# Patient Record
Sex: FEMALE | Race: BLACK OR AFRICAN AMERICAN | Employment: FULL TIME | URBAN - METROPOLITAN AREA
[De-identification: names, ages, dates, MRNs, and addresses within clinical notes are randomized per-mention and may not be internally consistent; named-entity substitution may affect disease eponyms.]

---

## 2018-06-04 ENCOUNTER — OFFICE VISIT (OUTPATIENT)
Dept: OBGYN CLINIC | Age: 53
End: 2018-06-04

## 2018-06-04 ENCOUNTER — HOSPITAL ENCOUNTER (OUTPATIENT)
Dept: LAB | Age: 53
Discharge: HOME | End: 2018-06-04
Payer: OTHER GOVERNMENT

## 2018-06-04 VITALS
BODY MASS INDEX: 23.48 KG/M2 | SYSTOLIC BLOOD PRESSURE: 144 MMHG | HEIGHT: 70 IN | OXYGEN SATURATION: 100 % | DIASTOLIC BLOOD PRESSURE: 84 MMHG | RESPIRATION RATE: 18 BRPM | WEIGHT: 164 LBS | HEART RATE: 90 BPM

## 2018-06-04 DIAGNOSIS — N90.89 VULVAR LESION: ICD-10-CM

## 2018-06-04 DIAGNOSIS — Z01.411 ENCOUNTER FOR GYNECOLOGICAL EXAMINATION WITH ABNORMAL FINDING: Primary | ICD-10-CM

## 2018-06-04 PROCEDURE — 88175 CYTOPATH C/V AUTO FLUID REDO: CPT | Performed by: OBSTETRICS & GYNECOLOGY

## 2018-06-04 PROCEDURE — 87624 HPV HI-RISK TYP POOLED RSLT: CPT | Performed by: OBSTETRICS & GYNECOLOGY

## 2018-06-04 RX ORDER — IMIQUIMOD 12.5 MG/.25G
CREAM TOPICAL
Qty: 24 PACKET | Refills: 1 | Status: SHIPPED | OUTPATIENT
Start: 2018-06-04 | End: 2022-03-11

## 2023-01-31 RX ORDER — ALBUTEROL SULFATE 90 UG/1
AEROSOL, METERED RESPIRATORY (INHALATION)
COMMUNITY

## 2023-01-31 RX ORDER — FLUTICASONE PROPIONATE 110 UG/1
AEROSOL, METERED RESPIRATORY (INHALATION)
COMMUNITY

## 2024-01-11 ENCOUNTER — HOSPITAL ENCOUNTER (OUTPATIENT)
Facility: HOSPITAL | Age: 59
Setting detail: RECURRING SERIES
Discharge: HOME | End: 2024-01-14
Payer: COMMERCIAL

## 2024-01-11 PROCEDURE — 97161 PT EVAL LOW COMPLEX 20 MIN: CPT

## 2024-01-11 NOTE — THERAPY EVALUATION
AKANKSHA MEJIA [facility]  [address], [address], [place] [address] Ph:[phone] Fx: [phone]  Plan of Care / Statement of Necessity for Physical Therapy Services     Patient Name: Alexander Pack : 1965   Medical   Diagnosis:  Pain in right shoulder [M25.511]  Pain in left shoulder [M25.512] Treatment Diagnosis: M25.511  RIGHT SHOULDER PAIN and M25.512  LEFT SHOULDER PAIN   Onset Date: 1.5 years for the L and 6 months for the R     Referral Source: Isidro Castellano PA Start of Care (SOC): 2024   Prior Hospitalization: See medical history Provider #: [id number]   Prior Level of Function: Chronic pain of the L shoulder with recent onset of R shoulder pain which impacts ADLs but does not prevent   Comorbidities: Arthritis, asthma, back pain, HTN     Assessment / key information:  Alexander Pack is a 58 y.o. female who presents to skilled PT for the treatment diagnosis of B shoulder pain. He has had PT for her shoulder blade on her L side about 1.5 years ago. Points to the pain in the L upper trap area. Recently started having a bout of R shoulder pain. Points to it as being right on the joint. Denies having had injections for the shoulders. She had Xrays for the R shoulder showing some arthritis. The R shoulder pain hurts more at night when sleeping on the R side. The L shoulder pain is almost constant. Pt reports of increase in pain with lifting. Overhead and also with reaching behind her back. Denies any neck pain. Denies N/T in B UE. Pt was prior prescribed for meloxicam for her hips, and has been taking this to address the pain. P is L handed.   Pt has history of B hip pain and has had prior injections for both hips. These really helped with PT.     Pain:   Current: 4/10     Worst: 5/10    Best: 3/10      Objective:    Posture: FHRS, slight exaggerated TS kyphosis     Cervical AROM (with 100% being full ROM)   Flexion: 60 deg   Extension: 49

## 2024-01-11 NOTE — PROGRESS NOTES
unattended, both untimed codes, in totals to left)  8-22 min = 1 unit; 23-37 min = 2 units; 38-52 min = 3 units; 53-67 min = 4 units; 68-82 min = 5 units   Total Total     [x]  Patient Education billed concurrently with other procedures   [x] Review HEP    [x] Progressed/Changed HEP, detail:    Access Code: [email or web address]  URL: [email or web address]  Date: 01/11/2024  Prepared by: Tee Hernández    Exercises  - Standing Upper Trapezius Mobilization with Small Ball  - 1-2 x daily - 7 x weekly - 1-2' hold  - Seated Upper Trapezius Stretch  - 1-2 x daily - 7 x weekly - 2 reps - 30-60\" hold  - Seated Levator Scapulae Stretch  - 1-2 x daily - 7 x weekly - 2 reps - 30-60\" hold  - Supine Cervical Rotation PROM  - 1 x daily - 7 x weekly - 10 reps - 5\" hold  - Standing Shoulder Abduction Slides at Wall  - 1-2 x daily - 7 x weekly - 10 reps - 5\" hold  - Standing Single Arm Shoulder Flexion Stretch on Wall  - 1-2 x daily - 7 x weekly - 10 reps - 5\" hold  - Single Arm Doorway Pec Stretch at 90 Degrees Abduction  - 1-2 x daily - 7 x weekly - 30-60\" hold    [x] Other detail:         Objective Information/Functional Measures/Assessment    See POC    Patient will continue to benefit from skilled PT / OT services to modify and progress therapeutic interventions, analyze and address functional mobility deficits, analyze and address ROM deficits, analyze and address strength deficits, analyze and address soft tissue restrictions, analyze and cue for proper movement patterns, analyze and modify for postural abnormalities, and instruct in home and community integration to address functional deficits and attain remaining goals.    Progress toward goals / Updated goals:  [x]  See POC    SEE POC for goals and assessment     NEXT PN/RC DUE   2/11/24     Auth Visit Count Auth Expiration Date   NA NA       PLAN  yes Continue plan of care  [x]  Upgrade activities as tolerated  []  Discharge due to :  []

## 2024-01-29 ENCOUNTER — TELEPHONE (OUTPATIENT)
Facility: HOSPITAL | Age: 59
End: 2024-01-29

## 2024-01-29 ENCOUNTER — APPOINTMENT (OUTPATIENT)
Facility: HOSPITAL | Age: 59
End: 2024-01-29
Payer: COMMERCIAL

## 2024-01-29 NOTE — TELEPHONE ENCOUNTER
Parker RENTERIA 1/29. Contacted pt and LVM (N/A) 1/29 @ 7:35 AM to let her know of cancellation and to call back if she'd like to r/s (today is her only appt on [facility])